# Patient Record
Sex: FEMALE | Race: WHITE | URBAN - METROPOLITAN AREA
[De-identification: names, ages, dates, MRNs, and addresses within clinical notes are randomized per-mention and may not be internally consistent; named-entity substitution may affect disease eponyms.]

---

## 2017-06-10 ENCOUNTER — HOSPITAL ENCOUNTER (EMERGENCY)
Facility: CLINIC | Age: 47
Discharge: HOME | End: 2017-06-10
Attending: INTERNAL MEDICINE | Admitting: INTERNAL MEDICINE
Payer: COMMERCIAL

## 2017-06-10 ENCOUNTER — APPOINTMENT (OUTPATIENT)
Dept: MRI IMAGING | Facility: CLINIC | Age: 47
End: 2017-06-10
Attending: INTERNAL MEDICINE
Payer: COMMERCIAL

## 2017-06-10 ENCOUNTER — APPOINTMENT (OUTPATIENT)
Dept: GENERAL RADIOLOGY | Facility: CLINIC | Age: 47
End: 2017-06-10
Attending: INTERNAL MEDICINE
Payer: COMMERCIAL

## 2017-06-10 VITALS
OXYGEN SATURATION: 92 % | TEMPERATURE: 98.2 F | SYSTOLIC BLOOD PRESSURE: 113 MMHG | HEART RATE: 86 BPM | DIASTOLIC BLOOD PRESSURE: 82 MMHG | RESPIRATION RATE: 14 BRPM

## 2017-06-10 DIAGNOSIS — M25.552 HIP PAIN, LEFT: ICD-10-CM

## 2017-06-10 PROCEDURE — 96374 THER/PROPH/DIAG INJ IV PUSH: CPT

## 2017-06-10 PROCEDURE — 73552 X-RAY EXAM OF FEMUR 2/>: CPT | Mod: LT

## 2017-06-10 PROCEDURE — 99285 EMERGENCY DEPT VISIT HI MDM: CPT | Mod: 25

## 2017-06-10 PROCEDURE — 96375 TX/PRO/DX INJ NEW DRUG ADDON: CPT

## 2017-06-10 PROCEDURE — 25000128 H RX IP 250 OP 636: Performed by: INTERNAL MEDICINE

## 2017-06-10 PROCEDURE — 72148 MRI LUMBAR SPINE W/O DYE: CPT

## 2017-06-10 PROCEDURE — 73560 X-RAY EXAM OF KNEE 1 OR 2: CPT | Mod: LT

## 2017-06-10 PROCEDURE — 96376 TX/PRO/DX INJ SAME DRUG ADON: CPT

## 2017-06-10 RX ORDER — LORAZEPAM 1 MG/1
1 TABLET ORAL EVERY 8 HOURS PRN
Qty: 20 TABLET | Refills: 0 | Status: SHIPPED | OUTPATIENT
Start: 2017-06-10

## 2017-06-10 RX ORDER — HYDROMORPHONE HYDROCHLORIDE 1 MG/ML
0.5 INJECTION, SOLUTION INTRAMUSCULAR; INTRAVENOUS; SUBCUTANEOUS
Status: DISCONTINUED | OUTPATIENT
Start: 2017-06-10 | End: 2017-06-10 | Stop reason: HOSPADM

## 2017-06-10 RX ORDER — CYCLOBENZAPRINE HCL 10 MG
10 TABLET ORAL 3 TIMES DAILY PRN
Qty: 20 TABLET | Refills: 0 | Status: SHIPPED | OUTPATIENT
Start: 2017-06-10 | End: 2017-06-16

## 2017-06-10 RX ORDER — OXYCODONE AND ACETAMINOPHEN 5; 325 MG/1; MG/1
1-2 TABLET ORAL EVERY 4 HOURS PRN
Qty: 15 TABLET | Refills: 0 | Status: SHIPPED | OUTPATIENT
Start: 2017-06-10

## 2017-06-10 RX ADMIN — Medication 0.5 MG: at 14:33

## 2017-06-10 RX ADMIN — Medication 0.5 MG: at 12:48

## 2017-06-10 NOTE — DISCHARGE INSTRUCTIONS
Discharge Instructions  Back Pain  You were seen today for back pain. Back pain can have many causes, but most will get better without surgery or other specific treatment. Sometimes there is a herniated ( slipped ) disc. We don t usually do MRI scans to look for these right away, since most herniated discs will get better on their own with time.  Today, we did not find any evidence that your back pain was caused by a serious condition, such as an infection, fracture, or tumor. However, sometimes symptoms develop over time and cannot be found during an emergency visit, so it is very important that you follow up with your primary doctor.  Return to the Emergency Department if:    You develop a fever with your back pain.     You have weakness or change in sensation in one or both legs.    You lose control of your bowels or bladder, or can t empty your bladder.    Your pain gets much worse.     Follow-up with your doctor:    Unless your pain has completely gone away, please make an appointment with your doctor within one week.  You may need further management of your back pain, such as more pain medication, imaging such as an X-ray or MRI, or physical therapy.    What can I do to help myself?    Remain Active -- People are often afraid that they will hurt their back further or delay recovery by remaining active, but this is one of the best things you can do for your back. In fact, prolonged bed rest is not recommended. Studies have shown that people with low back pain recover faster when they remain active. Movement helps to bring blood flow to the muscles and relieve muscle spasms as well as preventing loss of muscle strength.    Heat -- Using a heating pad can help with low back pain during the first few weeks. Do not sleep with a heating pad, as you can be burned.     Pain medications - You may take a pain medication such as Tylenol  (acetaminophen), Advil , Nuprin  (ibuprofen) or Aleve  (naproxen).  If you have  been given a narcotic such as Vicodin  (hydrocodone with acetaminophen), Percocet  (oxycodone with acetaminophen), codeine, or a muscle relaxant such as Flexeril  (cyclobenzaprine) or Soma  (carisoprodol), do not drive for four hours after you have taken it. If the narcotic contains Tylenol  (acetaminophen), do not take Tylenol  with it. All narcotics will cause constipation, so eat a high fiber diet.   If you were given a prescription for medicine here today, be sure to read all of the information (including the package insert) that comes with your prescription.  This will include important information about the medicine, its side effects, and any warnings that you need to know about.  The pharmacist who fills the prescription can provide more information and answer questions you may have about the medicine.  If you have questions or concerns that the pharmacist cannot address, please call or return to the Emergency Department.   Opioid Medication Information    Pain medications are among the most commonly prescribed medicines, so we are including this information for all our patients. If you did not receive pain medication or get a prescription for pain medicine, you can ignore it.     You may have been given a prescription for an opioid (narcotic) pain medicine and/or have received a pain medicine while here in the Emergency Department. These medicines can make you drowsy or impaired. You must not drive, operate dangerous equipment, or engage in any other dangerous activities while taking these medications. If you drive while taking these medications, you could be arrested for DUI, or driving under the influence. Do not drink any alcohol while you are taking these medications.     Opioid pain medications can cause addiction. If you have a history of chemical dependency of any type, you are at a higher risk of becoming addicted to pain medications.  Only take these prescribed medications to treat your pain when  all other options have been tried. Take it for as short a time and as few doses as possible. Store your pain pills in a secure place, as they are frequently stolen and provide a dangerous opportunity for children or visitors in your house to start abusing these powerful medications. We will not replace any lost or stolen medicine.  As soon as your pain is better, you should flush all your remaining medication.     Many prescription pain medications contain Tylenol  (acetaminophen), including Vicodin , Tylenol #3 , Norco , Lortab , and Percocet .  You should not take any extra pills of Tylenol  if you are using these prescription medications or you can get very sick.  Do not ever take more than 3000 mg of acetaminophen in any 24 hour period.    All opioids tend to cause constipation. Drink plenty of water and eat foods that have a lot of fiber, such as fruits, vegetables, prune juice, apple juice and high fiber cereal.  Take a laxative if you don t move your bowels at least every other day. Miralax , Milk of Magnesia, Colace , or Senna  can be used to keep you regular.      Remember that you can always come back to the Emergency Department if you are not able to see your regular doctor in the amount of time listed above, if you get any new symptoms, or if there is anything that worries you.

## 2017-06-10 NOTE — ED AVS SNAPSHOT
[facility] Emergency Department    [address]    [facility] [address]    Phone:  [phone]    Fax:  [phone]                                       Vera Noel   MRN: [id number]    Department:  [facility] Emergency Department   Date of Visit:  6/10/2017           Patient Information     Date Of Birth          1970        Your diagnoses for this visit were:     Hip pain, left        You were seen by Ebony Schwartz MD.      Follow-up Information     Follow up with Arron Nevarez MD In 2 days.    Specialty:  Orthopedics    Contact information:    [facility]  [address]  [facility] [address] [phone]          Discharge Instructions         Discharge Instructions  Back Pain  You were seen today for back pain. Back pain can have many causes, but most will get better without surgery or other specific treatment. Sometimes there is a herniated ( slipped ) disc. We don t usually do MRI scans to look for these right away, since most herniated discs will get better on their own with time.  Today, we did not find any evidence that your back pain was caused by a serious condition, such as an infection, fracture, or tumor. However, sometimes symptoms develop over time and cannot be found during an emergency visit, so it is very important that you follow up with your primary doctor.  Return to the Emergency Department if:    You develop a fever with your back pain.     You have weakness or change in sensation in one or both legs.    You lose control of your bowels or bladder, or can t empty your bladder.    Your pain gets much worse.     Follow-up with your doctor:    Unless your pain has completely gone away, please make an appointment with your doctor within one week.  You may need further management of your back pain, such as more pain medication, imaging such as an X-ray or MRI, or physical therapy.    What can I do to help  myself?    Remain Active -- People are often afraid that they will hurt their back further or delay recovery by remaining active, but this is one of the best things you can do for your back. In fact, prolonged bed rest is not recommended. Studies have shown that people with low back pain recover faster when they remain active. Movement helps to bring blood flow to the muscles and relieve muscle spasms as well as preventing loss of muscle strength.    Heat -- Using a heating pad can help with low back pain during the first few weeks. Do not sleep with a heating pad, as you can be burned.     Pain medications - You may take a pain medication such as Tylenol  (acetaminophen), Advil , Nuprin  (ibuprofen) or Aleve  (naproxen).  If you have been given a narcotic such as Vicodin  (hydrocodone with acetaminophen), Percocet  (oxycodone with acetaminophen), codeine, or a muscle relaxant such as Flexeril  (cyclobenzaprine) or Soma  (carisoprodol), do not drive for four hours after you have taken it. If the narcotic contains Tylenol  (acetaminophen), do not take Tylenol  with it. All narcotics will cause constipation, so eat a high fiber diet.   If you were given a prescription for medicine here today, be sure to read all of the information (including the package insert) that comes with your prescription.  This will include important information about the medicine, its side effects, and any warnings that you need to know about.  The pharmacist who fills the prescription can provide more information and answer questions you may have about the medicine.  If you have questions or concerns that the pharmacist cannot address, please call or return to the Emergency Department.   Opioid Medication Information    Pain medications are among the most commonly prescribed medicines, so we are including this information for all our patients. If you did not receive pain medication or get a prescription for pain medicine, you can ignore it.      You may have been given a prescription for an opioid (narcotic) pain medicine and/or have received a pain medicine while here in the Emergency Department. These medicines can make you drowsy or impaired. You must not drive, operate dangerous equipment, or engage in any other dangerous activities while taking these medications. If you drive while taking these medications, you could be arrested for DUI, or driving under the influence. Do not drink any alcohol while you are taking these medications.     Opioid pain medications can cause addiction. If you have a history of chemical dependency of any type, you are at a higher risk of becoming addicted to pain medications.  Only take these prescribed medications to treat your pain when all other options have been tried. Take it for as short a time and as few doses as possible. Store your pain pills in a secure place, as they are frequently stolen and provide a dangerous opportunity for children or visitors in your house to start abusing these powerful medications. We will not replace any lost or stolen medicine.  As soon as your pain is better, you should flush all your remaining medication.     Many prescription pain medications contain Tylenol  (acetaminophen), including Vicodin , Tylenol #3 , Norco , Lortab , and Percocet .  You should not take any extra pills of Tylenol  if you are using these prescription medications or you can get very sick.  Do not ever take more than 3000 mg of acetaminophen in any 24 hour period.    All opioids tend to cause constipation. Drink plenty of water and eat foods that have a lot of fiber, such as fruits, vegetables, prune juice, apple juice and high fiber cereal.  Take a laxative if you don t move your bowels at least every other day. Miralax , Milk of Magnesia, Colace , or Senna  can be used to keep you regular.      Remember that you can always come back to the Emergency Department if you are not able to see your regular doctor  in the amount of time listed above, if you get any new symptoms, or if there is anything that worries you.      24 Hour Appointment Hotline       To make an appointment at any [facility] call [phone] ([phone]). If you don't have a family doctor or clinic, we will help you find one. [place] clinics are conveniently located to serve the needs of you and your family.             Review of your medicines      START taking        Dose / Directions Last dose taken    cyclobenzaprine 10 MG tablet   Commonly known as:  FLEXERIL   Dose:  10 mg   Quantity:  20 tablet        Take 1 tablet (10 mg) by mouth 3 times daily as needed for muscle spasms   Refills:  0        oxyCODONE-acetaminophen 5-325 MG per tablet   Commonly known as:  PERCOCET   Dose:  1-2 tablet   Quantity:  15 tablet        Take 1-2 tablets by mouth every 4 hours as needed for pain   Refills:  0          Our records show that you are taking the medicines listed below. If these are incorrect, please call your family doctor or clinic.        Dose / Directions Last dose taken    INDOMETHACIN PO   Dose:  50 mg        Take 50 mg by mouth 3 times daily as needed for moderate pain   Refills:  0          ASK your doctor about these medications        Dose / Directions Last dose taken    * LORAZEPAM PO   Dose:  1 mg   What changed:  Another medication with the same name was added. Make sure you understand how and when to take each.   Ask about: Which instructions should I use?        Take 1 mg by mouth 3 times daily   Refills:  0        * LORazepam 1 MG tablet   Commonly known as:  ATIVAN   Dose:  1 mg   What changed:  You were already taking a medication with the same name, and this prescription was added. Make sure you understand how and when to take each.   Quantity:  20 tablet   Ask about: Which instructions should I use?        Take 1 tablet (1 mg) by mouth every 8 hours as needed for muscle spasms   Refills:  0        * Notice:  This list has  2 medication(s) that are the same as other medications prescribed for you. Read the directions carefully, and ask your doctor or other care provider to review them with you.            Prescriptions were sent or printed at these locations (3 Prescriptions)                   Other Prescriptions                Printed at Department/Unit printer (3 of 3)         oxyCODONE-acetaminophen (PERCOCET) 5-325 MG per tablet               LORazepam (ATIVAN) 1 MG tablet               cyclobenzaprine (FLEXERIL) 10 MG tablet                Procedures and tests performed during your visit     Femur XR,  2 views, left    Knee XR, 1-2 views, left    Lumbar spine MRI w/o contrast - surgery >10 yrs or none      Orders Needing Specimen Collection     None      Pending Results     Date and Time Order Name Status Description    6/10/2017 1223 Lumbar spine MRI w/o contrast - surgery >10 yrs or none Preliminary     6/10/2017 1223 Femur XR,  2 views, left Preliminary     6/10/2017 1223 Knee XR, 1-2 views, left Preliminary             Pending Culture Results     No orders found from 6/8/2017 to 6/11/2017.            Pending Results Instructions     If you had any lab results that were not finalized at the time of your Discharge, you can call the ED Lab Result RN at [phone]. You will be contacted by this team for any positive Lab results or changes in treatment. The nurses are available 7 days a week from 10A to 6:30P.  You can leave a message 24 hours per day and they will return your call.        Test Results From Your Hospital Stay        6/10/2017  2:10 PM      Narrative     LEFT KNEE ONE-TWO VIEWS, LEFT FEMUR TWO VIEWS   6/10/2017  1:59 PM     HISTORY: Knee and hip pain.    COMPARISON: None.        Impression     IMPRESSION: No evidence of fracture or osseous lesion. Mild  degenerative changes in the medial compartment of the right knee.         6/10/2017  2:10 PM      Narrative     LEFT KNEE ONE-TWO VIEWS, LEFT FEMUR TWO VIEWS    6/10/2017  1:59 PM     HISTORY: Knee and hip pain.    COMPARISON: None.        Impression     IMPRESSION: No evidence of fracture or osseous lesion. Mild  degenerative changes in the medial compartment of the right knee.         6/10/2017  2:49 PM      Narrative     MR LUMBAR SPINE WITHOUT CONTRAST   6/10/2017 2:24 PM    HISTORY: Left hip and leg pain with tingling, numbness and weakness  for 5 days. No specific injury.    TECHNIQUE: Sagittal T1 and T2 and STIR, axial proton density and T2  images.    COMPARISON: None.    FINDINGS: Five functional lumbar vertebral segments are assumed. The  caudal thecal sac contents appear intrinsically normal. There are a  few scattered small foraminal nerve root sheath cysts of no clinical  significance. Alignment in the sagittal plane is normal. No acute bony  abnormalities. There is a 1 cm slightly atypical benign hemangioma in  the right posterior L1 vertebral body.    T12-L1: Normal.    L1-L2: Signal loss and minimal disc space narrowing. There is a left  central focal disc bulge or minimal broad-based disc protrusion  slightly indenting the anterior thecal sac without direct impingement  on neural structures or central stenosis. No foraminal stenosis  bilaterally and the posterior facets are normal.    L2-L3: Normal.    L3-L4: Very early signal loss and minimal posterolateral disc bulging  without disc protrusion or central or significant foraminal stenosis.  Minimal posterior facet degenerative changes on the right.    L4-L5: Normal disc space. Minimal bilateral posterior facet  degenerative changes.    L5-S1: Signal loss and degenerative or developmental moderate disc  space narrowing. Posterior disc bulging associated with an annular  fissure which does not impinge on any neural structures and there is  no central or foraminal stenosis. Posterior facets are unremarkable.        Impression     IMPRESSION:   1. Early degenerative disc disease at L1-L2, L3-L4 and L5-S1 with  no  direct impingement on neural structures.  2. Otherwise no significant abnormalities.                Clinical Quality Measure: Blood Pressure Screening     Your blood pressure was checked while you were in the emergency department today. The last reading we obtained was  BP: 111/83 . Please read the guidelines below about what these numbers mean and what you should do about them.  If your systolic blood pressure (the top number) is less than 120 and your diastolic blood pressure (the bottom number) is less than 80, then your blood pressure is normal. There is nothing more that you need to do about it.  If your systolic blood pressure (the top number) is 120-139 or your diastolic blood pressure (the bottom number) is 80-89, your blood pressure may be higher than it should be. You should have your blood pressure rechecked within a year by a primary care provider.  If your systolic blood pressure (the top number) is 140 or greater or your diastolic blood pressure (the bottom number) is 90 or greater, you may have high blood pressure. High blood pressure is treatable, but if left untreated over time it can put you at risk for heart attack, stroke, or kidney failure. You should have your blood pressure rechecked by a primary care provider within the next 4 weeks.  If your provider in the emergency department today gave you specific instructions to follow-up with your doctor or provider even sooner than that, you should follow that instruction and not wait for up to 4 weeks for your follow-up visit.        Thank you for choosing [facility]       Thank you for choosing [facility] for your care. Our goal is always to provide you with excellent care. Hearing back from our patients is one way we can continue to improve our services. Please take a few minutes to complete the written survey that you may receive in the mail after you visit with us. Thank you!        [employer] Information     [employer] lets you send messages to your  "doctor, view your test results, renew your prescriptions, schedule appointments and more. To sign up, go to [email or web address] . Click on \"Log in\" on the left side of the screen, which will take you to the Welcome page. Then click on \"Sign up Now\" on the right side of the page.     You will be asked to enter the access code listed below, as well as some personal information. Please follow the directions to create your username and password.     Your access code is: [email or web address]  Expires: 2017  3:04 PM     Your access code will  in 90 days. If you need help or a new code, please call your [place] clinic or [phone].        Care EveryWhere ID     This is your Care EveryWhere ID. This could be used by other organizations to access your [facility] medical records  [id number]        After Visit Summary       This is your record. Keep this with you and show to your community pharmacist(s) and doctor(s) at your next visit.                  "

## 2017-06-10 NOTE — ED NOTES
Patient presents with left hip pain going down leg was seen at [facility] Thursday x 2 for issue. ABC's intact .

## 2017-06-10 NOTE — ED AVS SNAPSHOT
[facility] Emergency Department    [address]    [facility] [address]    Phone:  [phone]    Fax:  [phone]                                       Vera Noel   MRN: [id number]    Department:  [facility] Emergency Department   Date of Visit:  6/10/2017           After Visit Summary Signature Page     I have received my discharge instructions, and my questions have been answered. I have discussed any challenges I see with this plan with the nurse or doctor.    ..........................................................................................................................................  Patient/Patient Representative Signature      ..........................................................................................................................................  Patient Representative Print Name and Relationship to Patient    ..................................................               ................................................  Date                                            Time    ..........................................................................................................................................  Reviewed by Signature/Title    ...................................................              ..............................................  Date                                                            Time

## 2017-06-11 NOTE — ED PROVIDER NOTES
CHIEF COMPLAINT:  Pain in the left hip and leg.      HISTORY OF PRESENT ILLNESS:  Vera Noel is a 47-year-old woman who presents at recommendation of her chiropractor for further evaluation of pain in the left hip and leg.  The patient states that she woke with this pain about 4 days ago.  No injury that she can recall.  She was seen in [facility] emergency department where x-rays were obtained and interpreted as negative.  She was prescribed indomethacin and Ativan, but despite this says her symptoms have been steadily worsening and she is unable to sleep at night.  She says the pain is in the lateral to anterior part of the hip extending into the groin area and then radiates down the leg to the knee.  When she puts weight on her leg she feels a sharp stabbing pain in the tibial like shin splints and then the pain seems to radiate into the knee joint and cause her leg to give out on her.  She has not had any loss of bowel or bladder control and no saddle anesthesia.  No history of back problems or pain in the back itself.  No fever, chills, night sweats, or unintentional weight loss.  She went in to see her chiropractor today who thought this was sciatica and recommended an MRI scan.      PAST MEDICAL HISTORY:  No significant medical problems.      MEDICATIONS:  Indomethacin, lorazepam.      ALLERGIES:  No known drug allergies, but is allergic to contrast dye.      FAMILY AND SOCIAL HISTORY:  The patient is here with her .  She notes that she is off work for the summer.      REVIEW OF SYSTEMS:  All other systems are negative.      INITIAL EXAMINATION:   GENERAL:  Alert adult woman appears uncomfortable.   VITAL SIGNS:  Temperature 98.2, pulse 86, respirations 14, blood pressure 122/86, O2 sat 100% on room air.   BACK:  No distinct spinous process tenderness, no palpable muscle spasm.   GASTROINTESTINAL:  Plus normal bowel sounds, soft, no localized tenderness.   MUSCULOSKELETAL:  There is no swelling,  ecchymosis, or deformity in the left hip.  There is pain with flexion, rotation, or external rotation at the hip.  No definite radicular symptoms with straight leg raising.  No palpable effusion in the knee.   NEUROLOGIC:  Sensation is normal in the foot, no definite motor weakness.  Deep tendon reflexes are difficult to elicit at the knees and ankles bilaterally.      EMERGENCY ROOM COURSE:  I reviewed the patient's chart in Care Everywhere.  I ordered x-rays of the femur and knee on the left.  Radiology interpreted these as negative.  I ordered an MRI of the lumbar spine.  This showed degenerative changes, but no other distinct abnormalities.  While here, saline lock was established and we titrated Dilaudid to pain.  This significantly improved the patient's symptoms.      IMPRESSION:  Left hip pain.  The etiology of this is not clear to me.  Her symptoms definitely seem radicular.  It is possible she has some sciatic related to pyriformis syndrome.  The pain with rotation at the hip suggests more of a muscular disorder and this could fit with IT band syndrome.  She does not have any fever or other systemic symptoms to suggest septic arthritis of the hip.  There is no neurovascular compromise to suggest circulatory origin.  I think we can manage her symptomatically to close followup.  I have discharged the patient with Percocet as needed.  She has essentially used up her Ativan and I have given her additional Ativan, discussing that this is habit forming.  I have also prescribed Flexeril as needed.  Follow up with primary care or Orthopedics within 2-3 days.         DILIA MOLINA MD             D: 06/10/2017 15:11   T: 2017 08:12   MT: [id number]      Name:     CARLOS GALINDO   MRN:      [id number]        Account:      [id number]   :      1970           Visit Date:   06/10/2017      Document: [id number]